# Patient Record
Sex: MALE | Race: WHITE | NOT HISPANIC OR LATINO | Employment: FULL TIME | URBAN - METROPOLITAN AREA
[De-identification: names, ages, dates, MRNs, and addresses within clinical notes are randomized per-mention and may not be internally consistent; named-entity substitution may affect disease eponyms.]

---

## 2020-12-23 PROCEDURE — U0004 COV-19 TEST NON-CDC HGH THRU: HCPCS | Performed by: FAMILY MEDICINE

## 2020-12-26 ENCOUNTER — TELEPHONE (OUTPATIENT)
Dept: URGENT CARE | Facility: CLINIC | Age: 45
End: 2020-12-26

## 2022-06-13 PROCEDURE — 87070 CULTURE OTHR SPECIMN AEROBIC: CPT | Performed by: NURSE PRACTITIONER

## 2022-06-13 PROCEDURE — 87205 SMEAR GRAM STAIN: CPT | Performed by: NURSE PRACTITIONER

## 2023-08-28 ENCOUNTER — OFFICE VISIT (OUTPATIENT)
Dept: FAMILY MEDICINE CLINIC | Facility: CLINIC | Age: 48
End: 2023-08-28
Payer: COMMERCIAL

## 2023-08-28 VITALS
HEIGHT: 70 IN | SYSTOLIC BLOOD PRESSURE: 128 MMHG | BODY MASS INDEX: 28.98 KG/M2 | OXYGEN SATURATION: 97 % | DIASTOLIC BLOOD PRESSURE: 78 MMHG | WEIGHT: 202.4 LBS | HEART RATE: 83 BPM

## 2023-08-28 DIAGNOSIS — F41.9 ANXIETY: Primary | ICD-10-CM

## 2023-08-28 PROBLEM — R73.03 PREDIABETES: Status: ACTIVE | Noted: 2023-02-28

## 2023-08-28 PROBLEM — Z00.00 ENCOUNTER FOR GENERAL ADULT MEDICAL EXAMINATION WITHOUT ABNORMAL FINDINGS: Status: ACTIVE | Noted: 2022-01-03

## 2023-08-28 PROBLEM — K21.9 GASTROESOPHAGEAL REFLUX DISEASE: Status: ACTIVE | Noted: 2022-01-03

## 2023-08-28 RX ORDER — CITALOPRAM 20 MG/1
1 TABLET ORAL DAILY
COMMUNITY
Start: 2023-08-16

## 2023-08-28 RX ORDER — MULTIPLE VITAMINS W/ MINERALS TAB 9MG-400MCG
1 TAB ORAL DAILY
COMMUNITY

## 2023-08-28 RX ORDER — DOXYCYCLINE HYCLATE 100 MG/1
CAPSULE ORAL
COMMUNITY
Start: 2023-05-22 | End: 2023-08-28

## 2023-08-28 NOTE — PROGRESS NOTES
Lukas Paige  1975  [id number]  Patient Care Team:  Som Luis MD as PCP - General (Internal Medicine)    Lukas Paige is a 47 y.o. male here today to establish care.  This patient is accompanied by their self who contributes to the history of their care.    Chief Complaint:    Chief Complaint   Patient presents with    Anxiety        History of Present Illness:   47-year-old new patient here to establish care. Had been seeing Dr. Coh. HE as underlying  anxiety Since this past January  after career changes. He lists Celexa 20 mg daily as a medication. Seeing counsellor on prn basis. History of prostatitis treated with abx this past year.   Also has reflux and allergic rhinosinusitis. He is currently on over-the-counter omeprazole. Has history of lap anselmo. Hhe was seen annually for physical exam last Feb. He work out 3-4 day per week with treadmill work. Stable on Celexa 20. No HI/SI.     He has Ascencio syndrome ( pop)- patient underwent genetic analysis 3-4 years ago and was NEGATIVE. Patient underwent colonoscopy beginning of Junes ( [facility]).    Past Medical History:   Diagnosis Date    Anxiety        Past Surgical History:   Procedure Laterality Date    CHOLECYSTECTOMY      CYST REMOVAL      NOSE SURGERY      SHOULDER SURGERY          Family History   Problem Relation Age of Onset    Other Mother         dec PE    Diabetes Mother     Cancer Father         pros/skin/ascencio syndrome    Prostate cancer Father     Skin cancer Father     Cancer Paternal Uncle         ascencio syndrome    Cancer Paternal Grandfather         ascencio       Social History     Socioeconomic History    Marital status:    Tobacco Use    Smoking status: Never    Smokeless tobacco: Never   Vaping Use    Vaping Use: Never used   Substance and Sexual Activity    Alcohol use: No    Drug use: No    Sexual activity: Defer       Allergies   Allergen Reactions    Erythromycin        Review of Systems:    Review of Systems   Constitutional:  " Negative for chills, fatigue, fever, unexpected weight gain and unexpected weight loss.   HENT:  Negative for ear pain, postnasal drip, sinus pressure and sore throat.    Eyes:  Negative for blurred vision, double vision and visual disturbance.   Respiratory:  Negative for cough, shortness of breath and wheezing.    Cardiovascular:  Negative for chest pain, palpitations and leg swelling.   Gastrointestinal:  Negative for abdominal pain, blood in stool, diarrhea, nausea and vomiting.   Endocrine: Negative for cold intolerance, heat intolerance, polydipsia, polyphagia and polyuria.   Genitourinary:  Negative for dysuria, flank pain and hematuria.   Musculoskeletal:  Negative for arthralgias and joint swelling.   Skin:  Negative for dry skin and rash.   Neurological:  Negative for weakness, numbness and headache.   Psychiatric/Behavioral:  Negative for self-injury, suicidal ideas and depressed mood.      Vitals:    08/28/23 0949   BP: 128/78   Pulse: 83   SpO2: 97%   Weight: 91.8 kg (202 lb 6.4 oz)   Height: 177.8 cm (70\")     Body mass index is 29.04 kg/mý.      Current Outpatient Medications:     citalopram (CeleXA) 20 MG tablet, Take 1 tablet by mouth Daily., Disp: , Rfl:     Loratadine 10 MG capsule, Take  by mouth., Disp: , Rfl:     multivitamin with minerals tablet tablet, Take 1 tablet by mouth Daily., Disp: , Rfl:     OMEPRAZOLE PO, Take  by mouth., Disp: , Rfl:     Physical Exam:    Physical Exam  Vitals and nursing note reviewed.   Constitutional:       General: He is not in acute distress.     Appearance: He is well-developed. He is not diaphoretic.   HENT:      Head: Normocephalic and atraumatic.      Right Ear: External ear normal.      Left Ear: External ear normal.      Mouth/Throat:      Pharynx: No oropharyngeal exudate.   Eyes:      General: No scleral icterus.        Right eye: No discharge.      Conjunctiva/sclera: Conjunctivae normal.   Neck:      Thyroid: No thyromegaly.      Vascular: No JVD.    "   Trachea: No tracheal deviation.   Cardiovascular:      Rate and Rhythm: Normal rate and regular rhythm.      Heart sounds: Normal heart sounds.      Comments: PMI nondisplaced  Pulmonary:      Effort: Pulmonary effort is normal.      Breath sounds: Normal breath sounds. No wheezing or rales.   Abdominal:      General: Bowel sounds are normal.      Palpations: Abdomen is soft.      Tenderness: There is no abdominal tenderness. There is no guarding or rebound.   Musculoskeletal:      Cervical back: Normal range of motion and neck supple.   Lymphadenopathy:      Cervical: No cervical adenopathy.   Skin:     General: Skin is warm and dry.      Capillary Refill: Capillary refill takes less than 2 seconds.      Coloration: Skin is not pale.      Findings: No rash.   Neurological:      Mental Status: He is alert and oriented to person, place, and time.      Motor: No abnormal muscle tone.      Coordination: Coordination normal.   Psychiatric:         Judgment: Judgment normal.       Procedures    Results Review:    None    Assessment/Plan:    Problem List Items Addressed This Visit          Mental Health    Anxiety - Primary    Current Assessment & Plan     Well maintained in celexa            Plan of care reviewed with patient at the conclusion of today's visit. Education was provided regarding diagnosis and management.  Patient verbalizes understanding of and agreement with management plan.    Return in about 6 months (around 2/28/2024) for Annual.    Som Luis MD      Please note than portions of this note were completed [facility] a Voice Recognition Program           None

## 2023-10-05 ENCOUNTER — TELEPHONE (OUTPATIENT)
Dept: FAMILY MEDICINE CLINIC | Facility: CLINIC | Age: 48
End: 2023-10-05
Payer: COMMERCIAL

## 2023-10-05 NOTE — TELEPHONE ENCOUNTER
Spoke with patient  to get more information regarding his sx.   He stated this started about 9pm last night after running around outside playing touch football with his son. No injury noted. He started to have pains from his navel down into his rectum. He felt like he needed to use the restroom but when he tried it was more of a mucous like substance. The first bit was a little pinkish in color and then after it was more clear. He denies a fever as he has checked his temp and it has stayed in the 98 degrees, however last night he would get hot and cold a different times. The severity of his pain comes and goes.   He did have a situation with prostitis about a month ago and was unsure if this could be related. This is new for him.    I got him a first available appt with Dr. Thrasher for 7:45 am however they are suppose to be leaving town early tomorrow morning. The patient wants to know if this is somewhat normal or if there were OTC medications recommended for this or should he keep this appt?  I did urge patient that if his sx persist to get even more severe to go ahead and go to the ER.

## 2023-10-05 NOTE — TELEPHONE ENCOUNTER
Caller: Lukas Paige    Relationship: Self    Best call back number: [phone]    What is the best time to reach you: ANY TIME    Who are you requesting to speak with (clinical staff, provider,  specific staff member): NURSE    Do you know the name of the person who called: SELF    What was the call regarding: PATIENT HAS BEEN EXPERIENCING SOME SEVERE ABDOMINAL PAIN, CLEAR MUCOUS DISCHARGE WHEN DEFECATING. THIS STARTED LAST NIGHT. SHOULD PATIENT BE SEEN OR WHAT SHOULD HE DO? PLEASE ADVISE       [facility] Smokers Quitline ([phone])

## 2023-10-06 ENCOUNTER — OFFICE VISIT (OUTPATIENT)
Dept: FAMILY MEDICINE CLINIC | Facility: CLINIC | Age: 48
End: 2023-10-06
Payer: COMMERCIAL

## 2023-10-06 VITALS
SYSTOLIC BLOOD PRESSURE: 122 MMHG | HEIGHT: 70 IN | BODY MASS INDEX: 28.92 KG/M2 | WEIGHT: 202 LBS | OXYGEN SATURATION: 98 % | HEART RATE: 64 BPM | DIASTOLIC BLOOD PRESSURE: 84 MMHG

## 2023-10-06 DIAGNOSIS — Z23 FLU VACCINE NEED: ICD-10-CM

## 2023-10-06 DIAGNOSIS — N41.0 ACUTE PROSTATITIS: Primary | ICD-10-CM

## 2023-10-06 DIAGNOSIS — N41.0 ACUTE PROSTATITIS: ICD-10-CM

## 2023-10-06 DIAGNOSIS — R10.30 LOWER ABDOMINAL PAIN: ICD-10-CM

## 2023-10-06 LAB
BILIRUB BLD-MCNC: NEGATIVE MG/DL
CLARITY, POC: CLEAR
COLOR UR: YELLOW
EXPIRATION DATE: ABNORMAL
GLUCOSE UR STRIP-MCNC: NEGATIVE MG/DL
KETONES UR QL: NEGATIVE
LEUKOCYTE EST, POC: NEGATIVE
Lab: ABNORMAL
NITRITE UR-MCNC: NEGATIVE MG/ML
PH UR: 5.5 [PH] (ref 5–8)
PROT UR STRIP-MCNC: ABNORMAL MG/DL
RBC # UR STRIP: NEGATIVE /UL
SP GR UR: 1.03 (ref 1–1.03)
UROBILINOGEN UR QL: NORMAL

## 2023-10-06 PROCEDURE — 87086 URINE CULTURE/COLONY COUNT: CPT | Performed by: STUDENT IN AN ORGANIZED HEALTH CARE EDUCATION/TRAINING PROGRAM

## 2023-10-06 RX ORDER — LEVOFLOXACIN 500 MG/1
500 TABLET, FILM COATED ORAL DAILY
Qty: 14 TABLET | Refills: 0 | Status: SHIPPED | OUTPATIENT
Start: 2023-10-06 | End: 2023-10-20

## 2023-10-06 NOTE — PROGRESS NOTES
"Chief Complaint   Patient presents with    Abdominal Pain     Started late Wednesday night, having some mucus discharge initially it was pink and red but now clear when having bowel movements. That's better but still having some abdominal pain when sitting, if he coughs just uncomfortable       HPI:  Lukas Paige is a 48 y.o. male who presents today for lower abdominal, rectal pain  Pt called in yesterday, telephone encounter shown below:  \"He stated this started about 9pm last night after running around outside playing touch football with his son. No injury noted. He started to have pains from his navel down into his rectum. He felt like he needed to use the restroom but when he tried it was more of a mucous like substance. The first bit was a little pinkish in color and then after it was more clear. He denies a fever as he has checked his temp and it has stayed in the 98 degrees, however last night he would get hot and cold a different times. The severity of his pain comes and goes.   He did have a situation with prostatitis about a month ago and was unsure if this could be related. This is new for him.\"    Today patient reports that the mucousy discharge from the rectum has resolved.  He had 2 episodes of normal, soft bowel movements yesterday evening.  He is still experiencing intermittent periumbilical/suprapubic discomfort.  Also notes persistent pain perceived as just inside the rectum. This is worse with sitting down.  Denies any dysuria, hematuria, abnormal color/odor to the urine.  He states that his symptoms are similar to his first diagnosis of prostatitis.  He has had no recurrence since then and symptoms did completely clear with course of Bactrim.  He denies any fevers and did check his temperature at home last night.  He denies any recent constipation, diarrhea.  He did have colonoscopy in June or July and reports this was completely normal.  He denies any history of diverticulitis.  He denies any new " sexual partners, has been monogamous with his wife for 30+ years.  No history of STIs.  He denies any testicular pain, swelling or redness.        PE:  Vitals:    10/06/23 0746   BP: 122/84   Pulse: 64   SpO2: 98%      Body mass index is 28.98 kg/m².    Gen Appearance: NAD  HEENT: Normocephalic, PERRL, no thyromegaly, trachea midline  Lungs: Normal respiratory effort  Abd: Active BS throughout. Mild TTP just below the umbilicus and in the suprapubic region. No point TTP in the LLQ. No rebound tenderness or guarding.   SERGEY: No visible external hemorrhoids or other abnormalities. No discharge noted. Prostate is mildly enlarged and boggy, slight tenderness with direct palpation.     Current Outpatient Medications   Medication Sig Dispense Refill    citalopram (CeleXA) 20 MG tablet Take 1 tablet by mouth Daily.      Loratadine 10 MG capsule Take  by mouth.      multivitamin with minerals tablet tablet Take 1 tablet by mouth Daily.      OMEPRAZOLE PO Take  by mouth.      levoFLOXacin (Levaquin) 500 MG tablet Take 1 tablet by mouth Daily for 14 days. 14 tablet 0     No current facility-administered medications for this visit.        A/P:  Diagnoses and all orders for this visit:    1. Acute prostatitis (Primary)  -     Urine Culture - Urine, Urine, Clean Catch; Future  -     levoFLOXacin (Levaquin) 500 MG tablet; Take 1 tablet by mouth Daily for 14 days.  Dispense: 14 tablet; Refill: 0    2. Lower abdominal pain  -     POCT urinalysis dipstick, automated    3. Flu vaccine need  -     Fluzone >6 Months ([id number])       Patient presented with 3-day history of lower abdominal/suprapubic and rectal discomfort, exacerbated with sitting.  He did have a couple of episodes of mucus-like discharge from the rectum that has already resolved.  Exam today reveals boggy prostate that is mildly tender with palpation.  His abdominal exam showed some mild tenderness just below the umbilicus but no peritoneal signs and his pain does not  seem to be directly in the left lower quadrant.  In setting of a recent normal colonoscopy and normal BM's I feel something like colitis or diverticulitis is less likely.     His UA was negative apart from trace protein but was with his first episode of prostatitis as well.  This was sent for culture. Given that his symptoms are similar to his previous episode we will treat again as an early prostatitis with Levaquin x2 weeks. He reports no risk factors for STIs.  He was counseled on strict return precautions if his symptoms fail to improve or if he develops any other systemic symptoms such as fever or worsening pain.  At that time would consider CT imaging to evaluate for colonic abnormality or possible underlying prostate abscess.  In absence of fever I have low suspicion for this.       Return if symptoms worsen or fail to improve.     Dictated Utilizing Dragon Dictation    Please note that portions of this note were completed with a voice recognition program.    Part of this note may be an electronic transcription/translation of spoken language to printed text using the Dragon Dictation System.

## 2023-10-08 LAB — BACTERIA SPEC AEROBE CULT: NO GROWTH

## 2023-12-01 RX ORDER — CITALOPRAM 20 MG/1
20 TABLET ORAL DAILY
Qty: 90 TABLET | Refills: 0 | Status: SHIPPED | OUTPATIENT
Start: 2023-12-01

## 2023-12-01 NOTE — TELEPHONE ENCOUNTER
Caller: Lukas Paige    Relationship: Self    Best call back number: [phone]     Requested Prescriptions:   Requested Prescriptions     Pending Prescriptions Disp Refills    citalopram (CeleXA) 20 MG tablet       Sig: Take 1 tablet by mouth Daily.        Pharmacy where request should be sent: [facility]/PHARMACY #[id number] - [address] AT [facility] - [phone]  - [phone] FX     Last office visit with prescribing clinician: 8/28/2023   Last telemedicine visit with prescribing clinician: Visit date not found   Next office visit with prescribing clinician: 2/28/2024     Additional details provided by patient: REQUEST 90 DAY SUPPLY    Does the patient have less than a 3 day supply:  [] Yes  [x] No    Would you like a call back once the refill request has been completed: [] Yes [x] No    If the office needs to give you a call back, can they leave a voicemail: [] Yes [x] No    Karlos Manjarrez Rep   12/01/23 08:52 EST

## 2024-01-29 ENCOUNTER — OFFICE VISIT (OUTPATIENT)
Dept: FAMILY MEDICINE CLINIC | Facility: CLINIC | Age: 49
End: 2024-01-29
Payer: COMMERCIAL

## 2024-01-29 ENCOUNTER — LAB (OUTPATIENT)
Dept: LAB | Facility: HOSPITAL | Age: 49
End: 2024-01-29
Payer: COMMERCIAL

## 2024-01-29 VITALS
WEIGHT: 203.8 LBS | OXYGEN SATURATION: 98 % | SYSTOLIC BLOOD PRESSURE: 120 MMHG | DIASTOLIC BLOOD PRESSURE: 82 MMHG | BODY MASS INDEX: 29.18 KG/M2 | HEART RATE: 97 BPM | HEIGHT: 70 IN

## 2024-01-29 DIAGNOSIS — R10.823 RIGHT LOWER QUADRANT ABDOMINAL TENDERNESS WITH REBOUND TENDERNESS: ICD-10-CM

## 2024-01-29 DIAGNOSIS — R10.31 RIGHT LOWER QUADRANT ABDOMINAL PAIN: Primary | ICD-10-CM

## 2024-01-29 LAB
ALBUMIN SERPL-MCNC: 5.2 G/DL (ref 3.5–5.2)
ALBUMIN/GLOB SERPL: 2.2 G/DL
ALP SERPL-CCNC: 88 U/L (ref 39–117)
ALT SERPL W P-5'-P-CCNC: 21 U/L (ref 1–41)
ANION GAP SERPL CALCULATED.3IONS-SCNC: 11 MMOL/L (ref 5–15)
AST SERPL-CCNC: 18 U/L (ref 1–40)
BILIRUB SERPL-MCNC: 0.8 MG/DL (ref 0–1.2)
BILIRUB UR QL STRIP: NEGATIVE
BUN SERPL-MCNC: 15 MG/DL (ref 6–20)
BUN/CREAT SERPL: 10.7 (ref 7–25)
CALCIUM SPEC-SCNC: 10.2 MG/DL (ref 8.6–10.5)
CHLORIDE SERPL-SCNC: 102 MMOL/L (ref 98–107)
CLARITY UR: CLEAR
CO2 SERPL-SCNC: 28 MMOL/L (ref 22–29)
COLOR UR: ABNORMAL
CREAT SERPL-MCNC: 1.4 MG/DL (ref 0.76–1.27)
DEPRECATED RDW RBC AUTO: 42.6 FL (ref 37–54)
EGFRCR SERPLBLD CKD-EPI 2021: 62.4 ML/MIN/1.73
ERYTHROCYTE [DISTWIDTH] IN BLOOD BY AUTOMATED COUNT: 12.6 % (ref 12.3–15.4)
GLOBULIN UR ELPH-MCNC: 2.4 GM/DL
GLUCOSE SERPL-MCNC: 105 MG/DL (ref 65–99)
GLUCOSE UR STRIP-MCNC: NEGATIVE MG/DL
HCT VFR BLD AUTO: 46.1 % (ref 37.5–51)
HGB BLD-MCNC: 16 G/DL (ref 13–17.7)
HGB UR QL STRIP.AUTO: NEGATIVE
HOLD SPECIMEN: NORMAL
KETONES UR QL STRIP: ABNORMAL
LEUKOCYTE ESTERASE UR QL STRIP.AUTO: NEGATIVE
MCH RBC QN AUTO: 32.1 PG (ref 26.6–33)
MCHC RBC AUTO-ENTMCNC: 34.7 G/DL (ref 31.5–35.7)
MCV RBC AUTO: 92.6 FL (ref 79–97)
NITRITE UR QL STRIP: NEGATIVE
PH UR STRIP.AUTO: 5.5 [PH] (ref 5–8)
PLATELET # BLD AUTO: 221 10*3/MM3 (ref 140–450)
PMV BLD AUTO: 11.1 FL (ref 6–12)
POTASSIUM SERPL-SCNC: 4.4 MMOL/L (ref 3.5–5.2)
PROT SERPL-MCNC: 7.6 G/DL (ref 6–8.5)
PROT UR QL STRIP: ABNORMAL
RBC # BLD AUTO: 4.98 10*6/MM3 (ref 4.14–5.8)
SODIUM SERPL-SCNC: 141 MMOL/L (ref 136–145)
SP GR UR STRIP: 1.03 (ref 1–1.03)
UROBILINOGEN UR QL STRIP: ABNORMAL
WBC NRBC COR # BLD AUTO: 11.47 10*3/MM3 (ref 3.4–10.8)

## 2024-01-29 PROCEDURE — 87086 URINE CULTURE/COLONY COUNT: CPT | Performed by: INTERNAL MEDICINE

## 2024-01-29 PROCEDURE — 80053 COMPREHEN METABOLIC PANEL: CPT | Performed by: INTERNAL MEDICINE

## 2024-01-29 PROCEDURE — 99214 OFFICE O/P EST MOD 30 MIN: CPT | Performed by: INTERNAL MEDICINE

## 2024-01-29 PROCEDURE — 36415 COLL VENOUS BLD VENIPUNCTURE: CPT | Performed by: INTERNAL MEDICINE

## 2024-01-29 PROCEDURE — 85027 COMPLETE CBC AUTOMATED: CPT | Performed by: INTERNAL MEDICINE

## 2024-01-29 PROCEDURE — 81003 URINALYSIS AUTO W/O SCOPE: CPT | Performed by: INTERNAL MEDICINE

## 2024-01-29 RX ORDER — AMOXICILLIN AND CLAVULANATE POTASSIUM 875; 125 MG/1; MG/1
1 TABLET, FILM COATED ORAL 2 TIMES DAILY
Qty: 20 TABLET | Refills: 0 | Status: SHIPPED | OUTPATIENT
Start: 2024-01-29 | End: 2024-02-08

## 2024-01-29 NOTE — PROGRESS NOTES
"Lukas Paige  1975  [id number]  Patient Care Team:  Som Luis MD as PCP - General (Internal Medicine)    Lukas Paige is a 48 y.o. male here today for follow up.     This patient is accompanied by their self who contributes to the history of their care.    Chief Complaint:    Chief Complaint   Patient presents with    Abdominal Pain    Diarrhea        History of Present Illness:  I have reviewed and/or updated the patient's past medical, past surgical, family, social history, problem list and allergies as appropriate.     Has recurrent prostatitis history  Reports lower abdominal pain consitent 5/10 crescendo to 10/10  Past week he noted some abdominal pain with stting  He has noted no diarrhea. There ah been some nausea. Took zofran  No vomiting.  Decreased p.o. intake.  Denies fevers has been chills.  Pain is constant and dull. He notes pressure with sitting in lower quadrants. There is sharp sporadic pain.  Pain is worse with lying leesa. He feels better lying on side. No illness at home he has pain ( rectally with defecations)    Urinating more, slightly cloudy, weak stream. No dysuria or hematuria. There has been no relief with voiding, however urinating more.    Jumping or running makes pain worse as does bumpy car rides.     Colonoscopy last summer with Dr. Roman No polyps. No bx taken    Has not had ct of abdomen/pelvis. Still   has appendix.         Review of Systems   Constitutional:  Positive for appetite change and chills. Negative for fever, unexpected weight gain and unexpected weight loss.   Gastrointestinal:  Positive for abdominal distention, abdominal pain, nausea and rectal pain.       Vitals:    01/29/24 1435   BP: 120/82   Pulse: 97   SpO2: 98%   Weight: 92.4 kg (203 lb 12.8 oz)   Height: 177.8 cm (70\")   PainSc:   5   PainLoc: Abdomen     Body mass index is 29.24 kg/m².    Physical Exam  Vitals and nursing note reviewed.   Constitutional:       General: He is not in acute distress.     " Appearance: He is well-developed. He is ill-appearing. He is not diaphoretic.   HENT:      Head: Normocephalic and atraumatic.      Right Ear: External ear normal.      Left Ear: External ear normal.      Mouth/Throat:      Pharynx: No oropharyngeal exudate.   Eyes:      General: No scleral icterus.        Right eye: No discharge.      Conjunctiva/sclera: Conjunctivae normal.   Neck:      Thyroid: No thyromegaly.      Vascular: No JVD.      Trachea: No tracheal deviation.   Cardiovascular:      Rate and Rhythm: Normal rate and regular rhythm.      Heart sounds: Normal heart sounds.      Comments: PMI nondisplaced  Pulmonary:      Effort: Pulmonary effort is normal.      Breath sounds: Normal breath sounds. No wheezing or rales.   Abdominal:      General: Bowel sounds are normal.      Palpations: Abdomen is soft.      Tenderness: There is abdominal tenderness. There is rebound. There is no guarding.      Comments: Has positive tenderness to palpation with rebound and voluntary guarding in the right lower quadrant.  There is a positive Rovsing sign on the left.  Diminished bowel sounds.  There is some suprapubic tenderness as well.   Abdomen is not soft however not boardt rigid.   Musculoskeletal:      Cervical back: Normal range of motion and neck supple.   Lymphadenopathy:      Cervical: No cervical adenopathy.   Skin:     General: Skin is warm and dry.      Capillary Refill: Capillary refill takes less than 2 seconds.      Coloration: Skin is not pale.      Findings: No rash.   Neurological:      Mental Status: He is alert and oriented to person, place, and time.      Motor: No abnormal muscle tone.      Coordination: Coordination normal.   Psychiatric:         Mood and Affect: Mood normal.         Behavior: Behavior normal.         Judgment: Judgment normal.         Procedures    Results Review:    I reviewed the patient's new clinical results.    Assessment/Plan:    Problem List Items Addressed This Visit     None  Visit Diagnoses       Right lower quadrant abdominal pain    -  Primary    Relevant Orders    CBC (No Diff)    Comprehensive Metabolic Panel    Urine Culture - Urine, Urine, Clean Catch    Urinalysis With Culture If Indicated - Urine, Clean Catch    CT Abdomen Pelvis Without Contrast    Right lower quadrant abdominal tenderness with rebound tenderness        Relevant Orders    CT Abdomen Pelvis Without Contrast        Definite peritoneal signs.  Have requested a stat CT of his abdomen pelvis as well as labs CBC CMP urinalysis and urine culture.  Disposition follow-up will be based on results of CT and labs.  She will diagnosis ncludes retrocecal appendixcitis diverticulitis,, colitis or genitourinary source.    Plan of care reviewed with patient at the conclusion of today's visit. Education was provided regarding diagnosis and management.  Patient verbalizes understanding of and agreement with management plan.    Return for Follow up after testing.    Som Luis MD      Please note than portions of this note were completed [facility] a Voice Recognition Program

## 2024-01-30 ENCOUNTER — HOSPITAL ENCOUNTER (OUTPATIENT)
Dept: CT IMAGING | Facility: HOSPITAL | Age: 49
Discharge: HOME | End: 2024-01-30
Admitting: INTERNAL MEDICINE
Payer: COMMERCIAL

## 2024-01-30 DIAGNOSIS — R10.31 RIGHT LOWER QUADRANT ABDOMINAL PAIN: ICD-10-CM

## 2024-01-30 DIAGNOSIS — R10.823 RIGHT LOWER QUADRANT ABDOMINAL TENDERNESS WITH REBOUND TENDERNESS: ICD-10-CM

## 2024-01-30 LAB — BACTERIA SPEC AEROBE CULT: NO GROWTH

## 2024-01-30 PROCEDURE — 0 DIATRIZOATE MEGLUMINE & SODIUM PER 1 ML: Performed by: INTERNAL MEDICINE

## 2024-01-30 PROCEDURE — 74176 CT ABD & PELVIS W/O CONTRAST: CPT

## 2024-01-30 RX ADMIN — DIATRIZOATE MEGLUMINE AND DIATRIZOATE SODIUM 15 ML: 660; 100 LIQUID ORAL; RECTAL at 13:40

## 2024-02-01 ENCOUNTER — TELEPHONE (OUTPATIENT)
Dept: FAMILY MEDICINE CLINIC | Facility: CLINIC | Age: 49
End: 2024-02-01
Payer: COMMERCIAL

## 2024-02-01 NOTE — TELEPHONE ENCOUNTER
Unable to reach Lukas Paige by phone or leave message.    Hub may relay message and document.    The patient will have a colonoscopy scheduled for imaging of the colon to view the sigmoid diverticulitis. He will receive a call to schedule this. The gastroenterologist will review all information with him from start to finish and treatment if needed.

## 2024-02-01 NOTE — TELEPHONE ENCOUNTER
Left message for Lukas Paige  to return my call.    Hub may relay message and document    CT is consistent with sidgmoid diverticulitis. His wbc is up to support this. Kidney function action off a bit and possibly due to dehydration   Soft low residue diet,.push fluids..Will need colonsopy in about 8 weeks.to follow this up. Continue augmentin. If worsens he should.go to er. Thanks    Results were also sent on [employer] that patient had read. Please document if he has further questions specifically.

## 2024-02-01 NOTE — TELEPHONE ENCOUNTER
The patient called back. I read the last message to him regarding the colonoscopy needed, and that the Gastroenterologist will review with him what is needed regarding treatment. He said that he would like to go to [facility], Victorino Roman MD. Dr. Roman did a colonoscopy on him last year. He gave me all the information for Dr. Roman:    Website - [employer]  [facility]  [address], [place]  [address]  Phone: [phone]  Fax:     [phone]

## 2024-02-01 NOTE — TELEPHONE ENCOUNTER
Name: Lukas Paige    Relationship: Self    Best Callback Number: [phone]    HUB PROVIDED THE RELAY MESSAGE FROM THE OFFICE   PATIENT HAS FURTHER QUESTIONS AND WOULD LIKE A CALL BACK AT THE FOLLOWING PHONE NUMBER [phone].  PATIENT HAS QUESTIONS ABOUT WHAT TO EXPECT BECAUSE HE IS UNSURE ABOUT SIGMOID DIVERTICULITIS

## 2024-02-01 NOTE — TELEPHONE ENCOUNTER
Caller: Lukas Paige    Relationship: Self    Best call back number: [phone] (home)     What test was performed: CT SCAN AND LABS      PATIENT IS REQUESTING A CALL BACK   TO DISCUSS THE RESULTS AND SEE WHAT IS NEXT

## 2024-02-08 DIAGNOSIS — R10.823 RIGHT LOWER QUADRANT ABDOMINAL TENDERNESS WITH REBOUND TENDERNESS: Primary | ICD-10-CM

## 2024-02-14 ENCOUNTER — OFFICE VISIT (OUTPATIENT)
Dept: FAMILY MEDICINE CLINIC | Facility: CLINIC | Age: 49
End: 2024-02-14
Payer: COMMERCIAL

## 2024-02-14 VITALS
WEIGHT: 204.8 LBS | SYSTOLIC BLOOD PRESSURE: 124 MMHG | OXYGEN SATURATION: 98 % | DIASTOLIC BLOOD PRESSURE: 82 MMHG | BODY MASS INDEX: 29.32 KG/M2 | HEIGHT: 70 IN | HEART RATE: 96 BPM

## 2024-02-14 DIAGNOSIS — R05.9 COUGH, UNSPECIFIED TYPE: Primary | ICD-10-CM

## 2024-02-14 LAB
EXPIRATION DATE: NORMAL
FLUAV AG UPPER RESP QL IA.RAPID: NOT DETECTED
FLUBV AG UPPER RESP QL IA.RAPID: NOT DETECTED
INTERNAL CONTROL: NORMAL
Lab: NORMAL
SARS-COV-2 AG UPPER RESP QL IA.RAPID: NOT DETECTED

## 2024-02-14 PROCEDURE — 99213 OFFICE O/P EST LOW 20 MIN: CPT | Performed by: INTERNAL MEDICINE

## 2024-02-14 PROCEDURE — 87428 SARSCOV & INF VIR A&B AG IA: CPT | Performed by: INTERNAL MEDICINE

## 2024-02-14 RX ORDER — DOXYCYCLINE HYCLATE 100 MG/1
100 CAPSULE ORAL 2 TIMES DAILY
Qty: 28 CAPSULE | Refills: 0 | Status: SHIPPED | OUTPATIENT
Start: 2024-02-14

## 2024-02-14 RX ORDER — METHYLPREDNISOLONE 4 MG/1
TABLET ORAL
Qty: 1 EACH | Refills: 0 | Status: SHIPPED | OUTPATIENT
Start: 2024-02-14

## 2024-02-14 RX ORDER — GUAIFENESIN AND DEXTROMETHORPHAN HYDROBROMIDE 600; 30 MG/1; MG/1
1 TABLET, EXTENDED RELEASE ORAL EVERY 12 HOURS SCHEDULED
Qty: 60 TABLET | Refills: 0 | Status: SHIPPED | OUTPATIENT
Start: 2024-02-14

## 2024-02-20 ENCOUNTER — TELEPHONE (OUTPATIENT)
Dept: FAMILY MEDICINE CLINIC | Facility: CLINIC | Age: 49
End: 2024-02-20
Payer: COMMERCIAL

## 2024-02-20 ENCOUNTER — DOCUMENTATION (OUTPATIENT)
Dept: FAMILY MEDICINE CLINIC | Facility: CLINIC | Age: 49
End: 2024-02-20
Payer: COMMERCIAL

## 2024-02-20 RX ORDER — ONDANSETRON HYDROCHLORIDE 8 MG/1
8 TABLET, FILM COATED ORAL EVERY 8 HOURS PRN
Qty: 30 TABLET | Refills: 0 | Status: SHIPPED | OUTPATIENT
Start: 2024-02-20

## 2024-02-20 RX ORDER — AMOXICILLIN AND CLAVULANATE POTASSIUM 875; 125 MG/1; MG/1
1 TABLET, FILM COATED ORAL 2 TIMES DAILY
Qty: 20 TABLET | Refills: 0 | Status: SHIPPED | OUTPATIENT
Start: 2024-02-20

## 2024-02-20 NOTE — TELEPHONE ENCOUNTER
Caller: Lukas Paige     Relationship: SELF    Best call back number: [phone]     What is your medical concern? DRY HEAVING, NAUSEA, ABDOMINAL PAIN, CRAMPING    How long has this issue been going on? A FEW DAYS AGO AND GETTING WORSE WITH EACH DAY    Is your provider already aware of this issue? NO    Have you been treated for this issue? NO    ASKING IF POSSIBLE THE NEW MEDICATION COULD BE CAUSING THIS OR IF IT IS SOMETHING NEW WITH HIS NEW DIAGNOSIS OR IF IT COULD BE RELATED TO DIVERTICULITIS.    PLEASE CALL TO ADVISE.

## 2024-03-01 ENCOUNTER — OFFICE VISIT (OUTPATIENT)
Dept: FAMILY MEDICINE CLINIC | Facility: CLINIC | Age: 49
End: 2024-03-01
Payer: COMMERCIAL

## 2024-03-01 ENCOUNTER — LAB (OUTPATIENT)
Dept: LAB | Facility: HOSPITAL | Age: 49
End: 2024-03-01
Payer: COMMERCIAL

## 2024-03-01 VITALS
HEART RATE: 68 BPM | SYSTOLIC BLOOD PRESSURE: 122 MMHG | DIASTOLIC BLOOD PRESSURE: 82 MMHG | HEIGHT: 70 IN | BODY MASS INDEX: 28.35 KG/M2 | OXYGEN SATURATION: 98 % | WEIGHT: 198 LBS

## 2024-03-01 DIAGNOSIS — Z00.00 ANNUAL PHYSICAL EXAM: ICD-10-CM

## 2024-03-01 DIAGNOSIS — G89.29 CHRONIC RLQ PAIN: ICD-10-CM

## 2024-03-01 DIAGNOSIS — R73.03 PREDIABETES: Primary | ICD-10-CM

## 2024-03-01 DIAGNOSIS — K40.90 RIGHT INGUINAL HERNIA: ICD-10-CM

## 2024-03-01 DIAGNOSIS — R10.31 CHRONIC RLQ PAIN: ICD-10-CM

## 2024-03-01 DIAGNOSIS — R39.9 LOWER URINARY TRACT SYMPTOMS (LUTS): ICD-10-CM

## 2024-03-01 DIAGNOSIS — R10.2 PERINEAL PAIN: ICD-10-CM

## 2024-03-01 PROBLEM — K57.90 DIVERTICULOSIS: Status: ACTIVE | Noted: 2024-03-01

## 2024-03-01 PROBLEM — E66.3 OVERWEIGHT (BMI 25.0-29.9): Status: ACTIVE | Noted: 2024-03-01

## 2024-03-01 PROCEDURE — 86803 HEPATITIS C AB TEST: CPT | Performed by: INTERNAL MEDICINE

## 2024-03-01 PROCEDURE — 80061 LIPID PANEL: CPT | Performed by: INTERNAL MEDICINE

## 2024-03-01 PROCEDURE — 84443 ASSAY THYROID STIM HORMONE: CPT | Performed by: INTERNAL MEDICINE

## 2024-03-01 PROCEDURE — 83036 HEMOGLOBIN GLYCOSYLATED A1C: CPT | Performed by: INTERNAL MEDICINE

## 2024-03-01 RX ORDER — CITALOPRAM 40 MG/1
40 TABLET ORAL DAILY
Qty: 90 TABLET | Refills: 1 | Status: SHIPPED | OUTPATIENT
Start: 2024-03-01

## 2024-03-01 NOTE — PROGRESS NOTES
"Lukas Paige  1975  [id number]  Patient Care Team:  Som Luis MD as PCP - General (Internal Medicine)    Lukas Paige is a 48 y.o. male who is here today for his annual physical   This patient is accompanied by his self who contributes to the history of his care.    Chief Complaint:    Chief Complaint   Patient presents with    Annual Exam        History of Present Illness:   Here for annual exam. He just underwent treatment for acute diverticulitis. Follow up colonoscopy. He is still having lower abdominal pain with bending cughing. His diverticulitis was left sided , still with pain on right side. Still with urinary frequency. He still bets pressure like symptoms with sitting. His pain severity dissipates on antibiotics. H the pain has preceeded the ct dx of diverticulitis by months. Possible ID evaluations was mentioned to patient if inflammatory markers were elevated..  he does have pressure with defecations.  Urinary symptoms vary. Sometimes incomplete voiding, no blood , no pain. Was originally dx with prostatitis prior to establishing hers. Has not seen urology.   He has been treateed at least 4 x for \"prostatitis\" with abx    Currently not working with counselor, anxiety has been slightly worse with the above issues. He is not opposed to increasing celexa.     Past Medical History:   Diagnosis Date    Anxiety        Past Surgical History:   Procedure Laterality Date    CHOLECYSTECTOMY      CYST REMOVAL      NOSE SURGERY      SHOULDER SURGERY          Family History   Problem Relation Age of Onset    Other Mother         dec PE    Diabetes Mother     Cancer Father         pros/skin/bueno syndrome    Prostate cancer Father     Skin cancer Father     Cancer Paternal Uncle         bueno syndrome    Cancer Paternal Grandfather         bueno       Social History     Socioeconomic History    Marital status:    Tobacco Use    Smoking status: Never    Smokeless tobacco: Never   Vaping Use    " "Vaping Use: Never used   Substance and Sexual Activity    Alcohol use: No    Drug use: No    Sexual activity: Defer       Allergies   Allergen Reactions    Erythromycin        Depression: PHQ-2 Depression Screening  Little interest or pleasure in doing things? 0-->not at all   Feeling down, depressed, or hopeless? 0-->not at all   PHQ-2 Total Score 0      Immunization History   Administered Date(s) Administered    COVID-19 (PFIZER) Purple Cap Monovalent 03/25/2021, 04/22/2021    Flublok 18+yrs 10/26/2019, 10/26/2020    Fluzone (or Fluarix & Flulaval for VFC) >6mos 11/27/2021, 10/06/2023    Influenza Injectable Mdck Pf Quad 01/17/2023    Influenza Quad Vaccine (Inpatient) 09/14/2017    Influenza, Unspecified 11/27/2021    Tdap 10/26/2020       Review of Systems:    Review of Systems   Gastrointestinal:  Positive for abdominal pain.   Genitourinary:  Positive for frequency and urgency.        Perineal pressure       Vitals:    03/01/24 1031   BP: 122/82   Pulse: 68   SpO2: 98%   Weight: 89.8 kg (198 lb)   Height: 177.8 cm (70\")     Body mass index is 28.41 kg/m².      Current Outpatient Medications:     Loratadine 10 MG capsule, Take  by mouth., Disp: , Rfl:     multivitamin with minerals tablet tablet, Take 1 tablet by mouth Daily., Disp: , Rfl:     OMEPRAZOLE PO, Take  by mouth., Disp: , Rfl:     ondansetron (Zofran) 8 MG tablet, Take 1 tablet by mouth Every 8 (Eight) Hours As Needed for Nausea or Vomiting., Disp: 30 tablet, Rfl: 0    citalopram (CeleXA) 40 MG tablet, Take 1 tablet by mouth Daily., Disp: 90 tablet, Rfl: 1    8/28/2023  09:49 10/6/2023  07:46 1/29/2024  14:35 2/14/2024  15:02                  BMI   BMI (Calculated) 29 29 29.2 29.4     Physical Exam:    Physical Exam  Vitals and nursing note reviewed.   Constitutional:       General: He is not in acute distress.     Appearance: He is well-developed. He is not diaphoretic.   HENT:      Head: Normocephalic and atraumatic.      Right Ear: External ear " normal.      Left Ear: External ear normal.      Mouth/Throat:      Pharynx: No oropharyngeal exudate.   Eyes:      General: No scleral icterus.        Right eye: No discharge.      Conjunctiva/sclera: Conjunctivae normal.   Neck:      Thyroid: No thyromegaly.      Vascular: No JVD.      Trachea: No tracheal deviation.   Cardiovascular:      Rate and Rhythm: Normal rate and regular rhythm.      Heart sounds: Normal heart sounds.      Comments: PMI nondisplaced  Pulmonary:      Effort: Pulmonary effort is normal.      Breath sounds: Normal breath sounds. No wheezing or rales.   Abdominal:      General: Bowel sounds are normal.      Palpations: Abdomen is soft.      Tenderness: There is no abdominal tenderness. There is no guarding or rebound.      Hernia: A hernia is present.      Comments: ? Direct righ tinguinal hernia?   Musculoskeletal:      Cervical back: Normal range of motion and neck supple.   Lymphadenopathy:      Cervical: No cervical adenopathy.   Skin:     General: Skin is warm and dry.      Capillary Refill: Capillary refill takes less than 2 seconds.      Coloration: Skin is not pale.      Findings: No rash.   Neurological:      Mental Status: He is alert and oriented to person, place, and time.      Motor: No abnormal muscle tone.      Coordination: Coordination normal.   Psychiatric:         Mood and Affect: Mood normal.         Behavior: Behavior normal.         Judgment: Judgment normal.     Answers submitted by the patient for this visit:  Primary Reason for Visit (Submitted on 2/29/2024)  What is the primary reason for your visit?: Other  Other (Submitted on 2/29/2024)  Please describe your symptoms.: Annual physical. Also follow-up regarding recent CT scan and colonoscopy.  Have you had these symptoms before?: No  How long have you been having these symptoms?: 1-4 days      Procedures    Results Review:    I reviewed the patient's new clinical results.    Assessment/Plan:    Problem List Items  Addressed This Visit       Prediabetes - Primary    Relevant Orders    Hemoglobin A1c     Other Visit Diagnoses       Annual physical exam        Relevant Orders    Lipid Panel    TSH Rfx On Abnormal To Free T4    Hepatitis C Antibody    Lower urinary tract symptoms (LUTS)        Relevant Orders    Ambulatory Referral to Urology    Perineal pain        Relevant Orders    Ambulatory Referral to Urology    Right inguinal hernia        Relevant Orders    Ambulatory Referral to General Surgery    Chronic RLQ pain        Relevant Orders    Ambulatory Referral to General Surgery        I suspect this jovanna is having significant perineal dysfunction and may benefit from ultimately pelvic physical therapy however I would like to get the benefit of a urologic evaluation with his lower urinary tract symptoms and perineal pain.  Also suspect I felt a direct inguinal hernia which may be causing some of his right-sided upper pain.  Never referred him to general surgery as well.    Plan of care was reviewed with patient at the conclusion of today's visit. Counseled patient with regards to good nutrition and diet. Maintaining a healthy lifestyle including exercise and physical activities. Spoke with patient on ways to reduce stress, getting adequate sleep and injury prevention.  Discussed prostate cancer screening, colon cancer screening including benefit of early detection and potential need for follow-up. Patient agrees to screenings today. Annual dental and eye exams were encouraged. Encouraged patient to continue to follow up with annual immunizations.     Return in about 6 months (around 9/1/2024).    Som Luis MD      Please note than portions of this note were completed [employer]

## 2024-03-02 LAB
CHOLEST SERPL-MCNC: 179 MG/DL (ref 0–200)
HBA1C MFR BLD: 5.8 % (ref 4.8–5.6)
HCV AB SER DONR QL: NORMAL
HDLC SERPL-MCNC: 52 MG/DL (ref 40–60)
LDLC SERPL CALC-MCNC: 105 MG/DL (ref 0–100)
LDLC/HDLC SERPL: 1.98 {RATIO}
TRIGL SERPL-MCNC: 121 MG/DL (ref 0–150)
TSH SERPL DL<=0.05 MIU/L-ACNC: 0.73 UIU/ML (ref 0.27–4.2)
VLDLC SERPL-MCNC: 22 MG/DL (ref 5–40)

## 2024-03-02 NOTE — PROGRESS NOTES
Please notify patient he remains prediabetic A1c at 5.8.  Rest of the labs look good.  Continue efforts at Mediterranean diet and exercise.

## 2024-03-04 RX ORDER — CITALOPRAM 20 MG/1
20 TABLET ORAL DAILY
Qty: 90 TABLET | Refills: 0 | OUTPATIENT
Start: 2024-03-04

## 2024-03-19 ENCOUNTER — PATIENT ROUNDING (BHMG ONLY) (OUTPATIENT)
Dept: UROLOGY | Facility: CLINIC | Age: 49
End: 2024-03-19
Payer: COMMERCIAL

## 2024-03-19 ENCOUNTER — OFFICE VISIT (OUTPATIENT)
Dept: UROLOGY | Facility: CLINIC | Age: 49
End: 2024-03-19
Payer: COMMERCIAL

## 2024-03-19 DIAGNOSIS — N28.1 RENAL CYST: ICD-10-CM

## 2024-03-19 DIAGNOSIS — R39.9 LOWER URINARY TRACT SYMPTOMS (LUTS): Primary | ICD-10-CM

## 2024-03-19 RX ORDER — DICYCLOMINE HCL 20 MG
1 TABLET ORAL 3 TIMES DAILY
COMMUNITY
Start: 2024-02-28

## 2024-03-19 NOTE — PROGRESS NOTES
Office Visit New Urology      Patient Name: Lukas Paige  : 1975   MRN: [id number]     Chief Complaint:    Chief Complaint   Patient presents with    Lower urinary tract symptoms       Referring Provider: Som Luis MD    History of Present Illness: Lukas Paige is a 48 y.o. male who presents to Urology today for evaluation of lower urinary tract symptoms.  Prior medical history significant for anxiety, GERD, prediabetes.  Progressive issues with lower urinary tract symptoms.  Predominant symptoms of urinary frequency, urgency, nocturia X5 per night.  Denies any feelings of having a weak stream, or incomplete bladder emptying.  Denies hesitancy or leakage.  Also endorses some symptoms of pelvic pressure, especially with defecation, though unclear if these also are always associated with his urinary symptoms.  Since his symptoms started, he has been treated empirically for prostatitis 4 times reportedly.    He drinks mostly water, though does have daily coffee or caffeinated beverage.  He states he has cut down on caffeinated beverages and has not noticed any improvement in the symptoms.  Denies bladder holding.    He denies any other urologic history, including recurrent UTIs, dysuria, flank pain/kidney stones, gross hematuria.  He denies ever seeing a urologist in the past, or undergoing any urologic procedures.  Family history significant for father and 2 uncles with prostate cancer, as well as paternal grandfather with colon cancer.    IPSS: 14 with a terrible quality of life    Subjective      Review of System: Review of Systems   Genitourinary:  Negative for decreased urine volume, difficulty urinating, dysuria, enuresis, flank pain, frequency, hematuria and urgency.      I have reviewed the ROS documented by my clinical staff, updated appropriately and I agree. Darinel Dyer PA-C    Past Medical History:   Past Medical History:   Diagnosis Date    Anxiety        Past Surgical History:    Past Surgical History:   Procedure Laterality Date    CHOLECYSTECTOMY      CYST REMOVAL      NOSE SURGERY      SHOULDER SURGERY         Family History:   Family History   Problem Relation Age of Onset    Other Mother         dec PE    Diabetes Mother     Cancer Father         pros/skin/bueno syndrome    Prostate cancer Father     Skin cancer Father     Cancer Paternal Uncle         bueno syndrome    Cancer Paternal Grandfather         bueno       Social History:   Social History     Socioeconomic History    Marital status:    Tobacco Use    Smoking status: Never    Smokeless tobacco: Never   Vaping Use    Vaping status: Never Used   Substance and Sexual Activity    Alcohol use: No    Drug use: No    Sexual activity: Defer       Medications:     Current Outpatient Medications:     citalopram (CeleXA) 40 MG tablet, Take 1 tablet by mouth Daily., Disp: 90 tablet, Rfl: 1    dicyclomine (BENTYL) 20 MG tablet, Take 1 tablet by mouth 3 times a day., Disp: , Rfl:     Loratadine 10 MG capsule, Take  by mouth., Disp: , Rfl:     multivitamin with minerals tablet tablet, Take 1 tablet by mouth Daily., Disp: , Rfl:     OMEPRAZOLE PO, Take  by mouth., Disp: , Rfl:     ondansetron (Zofran) 8 MG tablet, Take 1 tablet by mouth Every 8 (Eight) Hours As Needed for Nausea or Vomiting., Disp: 30 tablet, Rfl: 0    Allergies:   Allergies   Allergen Reactions    Erythromycin        IPSS Questionnaire (AUA-7):  Over the past month…    1)  Incomplete Emptying  How often have you had a sensation of not emptying your bladder?  0 - Not at all   2)  Frequency  How often have you had to urinate less than every two hours? 4 - More than half the time   3)  Intermittency  How often have you found you stopped and started again several times when you urinated?  0 - Not at all   4) Urgency  How often have you found it difficult to postpone urination?  4 - More than half the time   5) Weak Stream  How often have you had a weak urinary stream?   1 - Less than 1 time in 5   6) Straining  How often have you had to push or strain to begin urination?  0 - Not at all   7) Nocturia  How many times did you typically get up at night to urinate?  5 - 5+ times   Total Score:  14       Quality of life due to urinary symptoms:  If you were to spend the rest of your life with your urinary condition the way it is now, how would you feel about that? 6-Terrible   Urine Leakage (Incontinence) 0-No Leakage         Objective     Physical Exam:   Vital Signs: There were no vitals filed for this visit.  There is no height or weight on file to calculate BMI.     Physical Exam  Vitals and nursing note reviewed.   Constitutional:       Appearance: Normal appearance.   HENT:      Head: Normocephalic and atraumatic.      Mouth/Throat:      Mouth: Mucous membranes are moist.      Pharynx: Oropharynx is clear.   Eyes:      Extraocular Movements: Extraocular movements intact.      Conjunctiva/sclera: Conjunctivae normal.   Cardiovascular:      Rate and Rhythm: Normal rate and regular rhythm.   Pulmonary:      Effort: Pulmonary effort is normal. No respiratory distress.   Abdominal:      Palpations: Abdomen is soft.   Musculoskeletal:         General: Normal range of motion.      Cervical back: Normal range of motion.   Skin:     General: Skin is warm and dry.   Neurological:      General: No focal deficit present.      Mental Status: He is alert and oriented to person, place, and time.   Psychiatric:         Mood and Affect: Mood normal.         Behavior: Behavior normal.         Labs:   Brief Urine Lab Results  (Last result in the past 365 days)        Color   Clarity   Blood   Leuk Est   Nitrite   Protein   CREAT   Urine HCG        01/29/24 1513 Dark Yellow   Clear   Negative   Negative   Negative   Trace                   Urine Culture          10/6/2023    09:03 1/29/2024    15:13   Urine Culture   Urine Culture No growth  No growth         Lab Results   Component Value Date     GLUCOSE 105 (H) 01/29/2024    CALCIUM 10.2 01/29/2024     01/29/2024    K 4.4 01/29/2024    CO2 28.0 01/29/2024     01/29/2024    BUN 15 01/29/2024    CREATININE 1.40 (H) 01/29/2024    EGFRIFNONA 67 08/14/2016    BCR 10.7 01/29/2024    ANIONGAP 11.0 01/29/2024       Lab Results   Component Value Date    WBC 11.47 (H) 01/29/2024    HGB 16.0 01/29/2024    HCT 46.1 01/29/2024    MCV 92.6 01/29/2024     01/29/2024       Images:   CT Abdomen Pelvis Without Contrast    Result Date: 1/30/2024  Impression: 1. Diverticular disease in the sigmoid colon with a focal area of colonic wall thickening and pericolonic fat stranding compatible with acute diverticulitis. No associated fluid collections or free air. Follow-up to complete resolution recommended to exclude an occult neoplasm Electronically Signed: Jose R Esteban MD  1/30/2024 3:01 PM EST  Workstation ID: [id number]      Measures:   Tobacco:   Lukas Paige  reports that he has never smoked. He has never used smokeless tobacco.     Assessment / Plan      Assessment/Plan:   48 y.o. male is here today for evaluation of lower urinary tract symptoms.  Predominant symptoms of urinary frequency, urgency, nocturia.  Denies any symptoms consistent with bladder outlet obstruction such as weak stream or incomplete bladder emptying.  We reviewed the CT of the abdomen pelvis performed on 1/30/2024 together in the office.  His prostate appears to be normal in size, and approximately 20 to 30 g based on my measurements.  He has no evidence of stones or hydronephrosis.  Appears to have a simple renal cyst on the upper pole of the left kidney.  We discussed this is a incidental finding and not contributing to his symptoms.  We discussed conservative management of LUTS with lifestyle changes including not drinking fluids within 2 hours of bedtime, timed voids, double voids, and further reduction of caffeinated beverages.  He likely also has some degree of pelvic floor  dysfunction based on his symptomatology, especially concerns of pelvic pressure with defecation in conjunction with urinary symptoms.  Recommended trial of pelvic floor physical therapy.  Given his family history of prostate cancer that include his father and 2 uncles, we will get a screening PSA.  If it is elevated, will get a confirmatory PSA at 3 months later to evaluate for trends.  If it is normal, will perform yearly screenings.  He is happy with the plan.    Diagnoses and all orders for this visit:    1. Lower urinary tract symptoms (LUTS) (Primary)  -     PSA Diagnostic; Future  -     Ambulatory Referral to Physical Therapy Evaluate and treat, Pelvic Floor    2. Renal cyst       Follow Up:   Return in about 2 months (around 5/19/2024) for Recheck with CHERRI GOMEZ.    I spent approximately 45 minutes providing clinical care for this patient; including review of patient's chart and provider documentation, face to face time spent with patient in examination room (obtaining history, performing physical exam, discussing diagnosis and management options), placing orders, and completing patient documentation.     Darinel Dyer PA-C  [facility] [place]

## 2024-03-19 NOTE — PROGRESS NOTES
A [employer] message has been sent to the patient for PATIENT ROUNDING with [facility].

## 2024-03-22 ENCOUNTER — LAB (OUTPATIENT)
Dept: LAB | Facility: HOSPITAL | Age: 49
End: 2024-03-22
Payer: COMMERCIAL

## 2024-03-22 DIAGNOSIS — R39.9 LOWER URINARY TRACT SYMPTOMS (LUTS): ICD-10-CM

## 2024-03-22 PROCEDURE — 36415 COLL VENOUS BLD VENIPUNCTURE: CPT

## 2024-03-22 PROCEDURE — 84153 ASSAY OF PSA TOTAL: CPT

## 2024-03-23 LAB — PSA SERPL-MCNC: 0.68 NG/ML (ref 0–4)

## 2024-03-26 ENCOUNTER — TELEPHONE (OUTPATIENT)
Dept: UROLOGY | Facility: CLINIC | Age: 49
End: 2024-03-26
Payer: COMMERCIAL

## 2024-03-26 NOTE — TELEPHONE ENCOUNTER
Patient seen in clinic for lower urinary tract symptoms.  Predominant symptoms of urinary frequency, urgency, nocturia.  Trial of pelvic floor physical therapy.  Family history of prostate cancer in father and 2 uncles.  PSA drawn for screening purposes.  It is normal.  Results were discussed with patient via MyChart.    Hi Lukas,    Your PSA level is in the normal range.  We can continue to follow this yearly given your family history of prostate cancer.  No change in our plans for pelvic floor therapy.  If any issues getting this scheduled, please let me know.    Thanks,  Darinel Dyer PA-C

## 2024-04-04 ENCOUNTER — TRANSCRIBE ORDERS (OUTPATIENT)
Dept: ADMINISTRATIVE | Facility: HOSPITAL | Age: 49
End: 2024-04-04
Payer: COMMERCIAL

## 2024-04-04 DIAGNOSIS — R10.31 RIGHT INGUINAL PAIN: Primary | ICD-10-CM

## 2024-06-17 ENCOUNTER — OFFICE VISIT (OUTPATIENT)
Dept: FAMILY MEDICINE CLINIC | Facility: CLINIC | Age: 49
End: 2024-06-17
Payer: COMMERCIAL

## 2024-06-17 VITALS
HEART RATE: 75 BPM | WEIGHT: 208.4 LBS | HEIGHT: 70 IN | OXYGEN SATURATION: 96 % | BODY MASS INDEX: 29.84 KG/M2 | DIASTOLIC BLOOD PRESSURE: 82 MMHG | TEMPERATURE: 98.4 F | SYSTOLIC BLOOD PRESSURE: 122 MMHG

## 2024-06-17 DIAGNOSIS — J10.1 INFLUENZA A: ICD-10-CM

## 2024-06-17 DIAGNOSIS — R05.9 COUGH, UNSPECIFIED TYPE: Primary | ICD-10-CM

## 2024-06-17 DIAGNOSIS — J01.10 ACUTE NON-RECURRENT FRONTAL SINUSITIS: ICD-10-CM

## 2024-06-17 LAB
EXPIRATION DATE: ABNORMAL
FLUAV AG UPPER RESP QL IA.RAPID: NOT DETECTED
FLUBV AG UPPER RESP QL IA.RAPID: NOT DETECTED
INTERNAL CONTROL: ABNORMAL
Lab: ABNORMAL
SARS-COV-2 AG UPPER RESP QL IA.RAPID: NOT DETECTED

## 2024-06-17 PROCEDURE — 87428 SARSCOV & INF VIR A&B AG IA: CPT | Performed by: INTERNAL MEDICINE

## 2024-06-17 PROCEDURE — 99214 OFFICE O/P EST MOD 30 MIN: CPT | Performed by: INTERNAL MEDICINE

## 2024-06-17 RX ORDER — AMOXICILLIN AND CLAVULANATE POTASSIUM 875; 125 MG/1; MG/1
1 TABLET, FILM COATED ORAL 2 TIMES DAILY
Qty: 20 TABLET | Refills: 0 | Status: SHIPPED | OUTPATIENT
Start: 2024-06-17 | End: 2024-06-27

## 2024-06-17 RX ORDER — GUAIFENESIN, PSEUDOEPHEDRINE HYDROCHLORIDE 600; 60 MG/1; MG/1
1 TABLET, EXTENDED RELEASE ORAL EVERY 12 HOURS
Qty: 28 TABLET | Refills: 0 | Status: SHIPPED | OUTPATIENT
Start: 2024-06-17

## 2024-06-17 RX ORDER — OSELTAMIVIR PHOSPHATE 75 MG/1
75 CAPSULE ORAL 2 TIMES DAILY
Qty: 10 CAPSULE | Refills: 0 | Status: SHIPPED | OUTPATIENT
Start: 2024-06-17

## 2024-06-17 NOTE — PROGRESS NOTES
"Lukas Paige  1975  [id number]  Patient Care Team:  Som Luis MD as PCP - General (Internal Medicine)    Lukas Paige is a 48 y.o. male here today for follow up.     This patient is accompanied by their self who contributes to the history of their care.    Chief Complaint:    Chief Complaint   Patient presents with    Cough    URI    Fever        History of Present Illness:  I have reviewed and/or updated the patient's past medical, past surgical, family, social history, problem list and allergies as appropriate.     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 Sx began this weekend. He has had a low grade temp. No myalgias. He has had a lot of head pressure and thichk green mucus Guaff/DM. He frontal pain and pressure. No soret hroat, wheezing or SOA. He has had a lot of fatigue.     Review of Systems   Constitutional:  Positive for fever. Negative for chills and fatigue.   HENT:  Positive for sinus pressure.    Eyes:  Negative for blurred vision and double vision.   Respiratory:  Positive for cough. Negative for shortness of breath and wheezing.    Cardiovascular:  Negative for chest pain, palpitations and leg swelling.   Gastrointestinal:  Negative for nausea and vomiting.   Endocrine: Negative for cold intolerance, heat intolerance, polydipsia and polyuria.   Musculoskeletal:  Negative for arthralgias and joint swelling.   Skin:  Negative for dry skin and rash.   Neurological:  Negative for weakness, numbness and headache.   Psychiatric/Behavioral:  Negative for self-injury, suicidal ideas and depressed mood.        Vitals:    06/17/24 0939   BP: 122/82   Pulse: 75   Temp: 98.4 °F (36.9 °C)   SpO2: 96%   Weight: 94.5 kg (208 lb 6.4 oz)   Height: 177.8 cm (70\")     Body mass index is 29.9 kg/m².    Physical Exam  Vitals and nursing note reviewed.   Constitutional:       General: He is not in acute distress.     " Appearance: He is well-developed. He is not diaphoretic.   HENT:      Head: Normocephalic and atraumatic.      Right Ear: External ear normal.      Left Ear: Tympanic membrane and external ear normal.      Mouth/Throat:      Pharynx: No oropharyngeal exudate.      Comments: Copious cloudy postnasal drainage.  Frontal sinus tenderness to palpation  Eyes:      General: No scleral icterus.        Right eye: No discharge.      Conjunctiva/sclera: Conjunctivae normal.   Neck:      Thyroid: No thyromegaly.      Vascular: No JVD.      Trachea: No tracheal deviation.   Cardiovascular:      Rate and Rhythm: Normal rate and regular rhythm.      Heart sounds: Normal heart sounds.      Comments: PMI nondisplaced  Pulmonary:      Effort: Pulmonary effort is normal.      Breath sounds: Normal breath sounds. No wheezing or rales.   Abdominal:      General: Bowel sounds are normal.      Palpations: Abdomen is soft.      Tenderness: There is no abdominal tenderness. There is no guarding or rebound.   Musculoskeletal:      Cervical back: Normal range of motion and neck supple.   Lymphadenopathy:      Cervical: No cervical adenopathy.   Skin:     General: Skin is warm and dry.      Capillary Refill: Capillary refill takes less than 2 seconds.      Coloration: Skin is not pale.      Findings: No rash.   Neurological:      Mental Status: He is alert and oriented to person, place, and time.      Motor: No abnormal muscle tone.      Coordination: Coordination normal.   Psychiatric:         Judgment: Judgment normal.         Procedures    Results Review:    I reviewed the patient's new clinical results.  ecimen Information: Swab   0 Result Notes        Component  Ref Range & Units 10:00 4 mo ago 8 mo ago   SARS Antigen  Not Detected, Presumptive Negative Not Detected Not Detected    Influenza A Antigen JOSE  Not Detected Not Detected Abnormal  Not Detected    Influenza B Antigen JOSE  Not Detected Not Detected Not Detected    Internal  Control  Passed Passed Passed    Lot Number [id number]   Expiration Date 02/15/2025 01/17/2025 03/25/24   [facility]   [facility] LABORATORY        Assessment/Plan:    Problem List Items Addressed This Visit       Influenza A    Current Assessment & Plan     Tamiflu, tylenol advil for symptomatic releif         Relevant Medications    oseltamivir (Tamiflu) 75 MG capsule     Other Visit Diagnoses       Cough, unspecified type    -  Primary    Relevant Orders    POCT SARS-CoV-2 + Flu Antigen JOSE (Completed)    Acute non-recurrent frontal sinusitis            Recommended prophylaxis for his wife, his son will be seen today at his pediatrician.  He is influenza A and likely frontal sinusitis.  I placed him on Augmentin as well as Tamiflu.  Mucinex D.    Plan of care reviewed with patient at the conclusion of today's visit. Education was provided regarding diagnosis and management.  Patient verbalizes understanding of and agreement with management plan.    No follow-ups on file.    Som Luis MD      Please note than portions of this note were completed [facility] a Voice Recognition Program

## 2024-08-27 RX ORDER — CITALOPRAM HYDROBROMIDE 40 MG/1
40 TABLET ORAL DAILY
Qty: 90 TABLET | Refills: 1 | Status: SHIPPED | OUTPATIENT
Start: 2024-08-27

## 2024-08-27 NOTE — TELEPHONE ENCOUNTER
Rx Refill Note  Requested Prescriptions     Pending Prescriptions Disp Refills    citalopram (CeleXA) 40 MG tablet [Pharmacy Med Name: CITALOPRAM HBR 40 MG TABLET] 90 tablet 1     Sig: TAKE 1 TABLET BY MOUTH EVERY DAY      Last office visit with prescribing clinician: 6/17/2024   Last telemedicine visit with prescribing clinician: Visit date not found   Next office visit with prescribing clinician: Visit date not found                         Would you like a call back once the refill request has been completed: [] Yes [] No    If the office needs to give you a call back, can they leave a voicemail: [] Yes [] No    Zita Chu MA  08/27/24, 10:40 EDT

## 2025-02-27 RX ORDER — CITALOPRAM HYDROBROMIDE 40 MG/1
40 TABLET ORAL DAILY
Qty: 90 TABLET | Refills: 1 | Status: SHIPPED | OUTPATIENT
Start: 2025-02-27

## 2025-02-27 NOTE — TELEPHONE ENCOUNTER
Rx Refill Note  Requested Prescriptions     Pending Prescriptions Disp Refills    citalopram (CeleXA) 40 MG tablet [Pharmacy Med Name: CITALOPRAM HBR 40 MG TABLET] 90 tablet 1     Sig: TAKE 1 TABLET BY MOUTH EVERY DAY      Last office visit with prescribing clinician: 6/17/2024   Last telemedicine visit with prescribing clinician: Visit date not found   Next office visit with prescribing clinician: Visit date not found                         Would you like a call back once the refill request has been completed: [] Yes [] No    If the office needs to give you a call back, can they leave a voicemail: [] Yes [] No    Melinda Turner MA  02/27/25, 12:07 EST

## 2025-07-16 DIAGNOSIS — Z71.84 TRAVEL ADVICE ENCOUNTER: Primary | ICD-10-CM

## 2025-07-16 RX ORDER — AMOXICILLIN 500 MG/1
1000 CAPSULE ORAL 2 TIMES DAILY
Qty: 40 CAPSULE | Refills: 0 | Status: SHIPPED | OUTPATIENT
Start: 2025-07-16

## 2025-07-16 RX ORDER — CIPROFLOXACIN 500 MG/1
500 TABLET, FILM COATED ORAL 2 TIMES DAILY
Qty: 14 TABLET | Refills: 0 | Status: SHIPPED | OUTPATIENT
Start: 2025-07-16

## 2025-07-16 RX ORDER — ONDANSETRON 4 MG/1
4 TABLET, FILM COATED ORAL EVERY 8 HOURS PRN
Qty: 40 TABLET | Refills: 0 | Status: SHIPPED | OUTPATIENT
Start: 2025-07-16